# Patient Record
Sex: OTHER/UNKNOWN | URBAN - METROPOLITAN AREA
[De-identification: names, ages, dates, MRNs, and addresses within clinical notes are randomized per-mention and may not be internally consistent; named-entity substitution may affect disease eponyms.]

---

## 2021-03-18 ENCOUNTER — AMBULATORY - RIVER FALLS (OUTPATIENT)
Dept: FAMILY MEDICINE | Facility: CLINIC | Age: 21
End: 2021-03-18

## 2022-02-15 NOTE — NURSING NOTE
[facility]  Depression Scale Entered On:  3/18/2021 10:26 AM CDT    Performed On:  3/18/2021 10:25 AM CDT by Huong Barron               [facility]  Depression Scale   EPDS Able to Laugh, See Funny Side :   As much as I always could   EPDS Look Forward With Enjoyment Things :   As much as I ever did   EPDS Blame Myself When Things Went Wrong :   Yes, some of the time   EPDS Anxious/Worried for No Good Reason :   Hardly ever   EPDS Scared/Panicky for No Good Reason :   No, not at all   EPDS Things Have Been Getting on Top of Me :   No, most of the time I have coped quite well   EPDS Unhappy, Difficulty Sleeping :   No, not at all   EPDS I Have Felt Sad or Miserable :   No, not at all   EPDS So Unhappy, You Have Been Crying :   Only occasionally   EPDS Thought of Harming Self :   Never   Score :   5    Huong Barron - 3/18/2021 10:25 AM CDT